# Patient Record
Sex: MALE | Race: WHITE | NOT HISPANIC OR LATINO | URBAN - METROPOLITAN AREA
[De-identification: names, ages, dates, MRNs, and addresses within clinical notes are randomized per-mention and may not be internally consistent; named-entity substitution may affect disease eponyms.]

---

## 2017-07-31 ENCOUNTER — OFFICE (OUTPATIENT)
Dept: URBAN - METROPOLITAN AREA CLINIC 64 | Facility: CLINIC | Age: 42
End: 2017-07-31

## 2017-07-31 VITALS
HEIGHT: 73 IN | HEART RATE: 76 BPM | SYSTOLIC BLOOD PRESSURE: 116 MMHG | DIASTOLIC BLOOD PRESSURE: 74 MMHG | WEIGHT: 248 LBS

## 2017-07-31 DIAGNOSIS — R07.9 CHEST PAIN, UNSPECIFIED: ICD-10-CM

## 2017-07-31 DIAGNOSIS — R10.13 EPIGASTRIC PAIN: ICD-10-CM

## 2017-07-31 DIAGNOSIS — K30 FUNCTIONAL DYSPEPSIA: ICD-10-CM

## 2017-07-31 DIAGNOSIS — R12 HEARTBURN: ICD-10-CM

## 2017-07-31 PROCEDURE — 99244 OFF/OP CNSLTJ NEW/EST MOD 40: CPT | Performed by: NURSE PRACTITIONER

## 2017-07-31 RX ORDER — SUCRALFATE 1 G/1
4 TABLET ORAL
Qty: 120 | Refills: 2 | Status: ACTIVE
Start: 2017-07-31

## 2017-08-30 ENCOUNTER — ON CAMPUS - OUTPATIENT (OUTPATIENT)
Dept: RURAL HOSPITAL 3 | Facility: HOSPITAL | Age: 42
End: 2017-08-30

## 2017-08-30 DIAGNOSIS — K21.9 GASTRO-ESOPHAGEAL REFLUX DISEASE WITHOUT ESOPHAGITIS: ICD-10-CM

## 2017-08-30 DIAGNOSIS — R10.13 EPIGASTRIC PAIN: ICD-10-CM

## 2017-08-30 DIAGNOSIS — R12 HEARTBURN: ICD-10-CM

## 2017-08-30 DIAGNOSIS — K30 FUNCTIONAL DYSPEPSIA: ICD-10-CM

## 2017-08-30 PROCEDURE — 43235 EGD DIAGNOSTIC BRUSH WASH: CPT | Performed by: INTERNAL MEDICINE

## 2018-10-16 ENCOUNTER — HOSPITAL ENCOUNTER (OUTPATIENT)
Dept: LAB | Facility: HOSPITAL | Age: 43
Discharge: HOME | End: 2018-10-16
Attending: INTERNAL MEDICINE | Admitting: INTERNAL MEDICINE

## 2018-10-16 ENCOUNTER — CONVERSION ENCOUNTER (OUTPATIENT)
Dept: RHEUMATOLOGY | Facility: CLINIC | Age: 43
End: 2018-10-16

## 2018-10-16 ENCOUNTER — HOSPITAL ENCOUNTER (OUTPATIENT)
Dept: RHEUMATOLOGY | Facility: CLINIC | Age: 43
Discharge: HOME | End: 2018-10-16
Attending: INTERNAL MEDICINE | Admitting: INTERNAL MEDICINE

## 2018-10-16 LAB
ALBUMIN SERPL-MCNC: 4.4 G/DL (ref 3.5–4.8)
ALBUMIN/GLOB SERPL: 1.5 {RATIO} (ref 1–1.7)
ALP SERPL-CCNC: 61 IU/L (ref 32–91)
ALT SERPL-CCNC: 75 IU/L (ref 17–63)
ANION GAP SERPL CALC-SCNC: 14.1 MMOL/L (ref 10–20)
AST SERPL-CCNC: 39 IU/L (ref 15–41)
BASOPHILS # BLD AUTO: 0.1 10*3/UL (ref 0–0.2)
BASOPHILS NFR BLD AUTO: 2 % (ref 0–2)
BILIRUB SERPL-MCNC: 0.8 MG/DL (ref 0.3–1.2)
BILIRUB UR QL STRIP: NEGATIVE MG/DL
BUN SERPL-MCNC: 15 MG/DL (ref 8–20)
BUN/CREAT SERPL: 16.7 (ref 6.2–20.3)
CALCIUM SERPL-MCNC: 9.4 MG/DL (ref 8.9–10.3)
CASTS URNS QL MICRO: NORMAL /[LPF]
CCP IGG ANTIBODIES: <0.4 U/ML
CHLORIDE SERPL-SCNC: 102 MMOL/L (ref 101–111)
COLOR UR: YELLOW
CONV BACTERIA IN URINE MICRO: NEGATIVE
CONV CLARITY OF URINE: CLEAR
CONV CO2: 27 MMOL/L (ref 22–32)
CONV HYALINE CASTS IN URINE MICRO: 0 /[LPF] (ref 0–5)
CONV PROTEIN IN URINE BY AUTOMATED TEST STRIP: NEGATIVE MG/DL
CONV SMALL ROUND CELLS: NORMAL /[HPF]
CONV TOTAL PROTEIN: 7.4 G/DL (ref 6.1–7.9)
CONV UROBILINOGEN IN URINE BY AUTOMATED TEST STRIP: 0.2 MG/DL
CREAT UR-MCNC: 0.9 MG/DL (ref 0.7–1.2)
CRP SERPL-MCNC: 0.12 MG/DL (ref 0–0.7)
CULTURE INDICATED?: NORMAL
DIFFERENTIAL METHOD BLD: (no result)
EOSINOPHIL # BLD AUTO: 0.2 10*3/UL (ref 0–0.3)
EOSINOPHIL # BLD AUTO: 3 % (ref 0–3)
ERYTHROCYTE [DISTWIDTH] IN BLOOD BY AUTOMATED COUNT: 13.2 % (ref 11.5–14.5)
ERYTHROCYTE [SEDIMENTATION RATE] IN BLOOD BY WESTERGREN METHOD: 12 MM/HR (ref 0–15)
GLOBULIN UR ELPH-MCNC: 3 G/DL (ref 2.5–3.8)
GLUCOSE SERPL-MCNC: 112 MG/DL (ref 65–99)
GLUCOSE UR QL: NEGATIVE MG/DL
HAV IGM SERPL QL IA: NONREACTIVE
HBV CORE IGM SERPL QL IA: NONREACTIVE
HBV SURFACE AG SERPL QL IA: NONREACTIVE
HCT VFR BLD AUTO: 44.2 % (ref 40–54)
HCV AB SER DONR QL: NONREACTIVE
HGB BLD-MCNC: 15.1 G/DL (ref 14–18)
HGB UR QL STRIP: NEGATIVE
KETONES UR QL STRIP: NEGATIVE MG/DL
LEUKOCYTE ESTERASE UR QL STRIP: NEGATIVE
LYMPHOCYTES # BLD AUTO: 1.7 10*3/UL (ref 0.8–4.8)
LYMPHOCYTES NFR BLD AUTO: 30 % (ref 18–42)
MCH RBC QN AUTO: 29.2 PG (ref 26–32)
MCHC RBC AUTO-ENTMCNC: 34.1 G/DL (ref 32–36)
MCV RBC AUTO: 85.7 FL (ref 80–94)
MONOCYTES # BLD AUTO: 0.5 10*3/UL (ref 0.1–1.3)
MONOCYTES NFR BLD AUTO: 9 % (ref 2–11)
NEUTROPHILS # BLD AUTO: 3.1 10*3/UL (ref 2.3–8.6)
NEUTROPHILS NFR BLD AUTO: 56 % (ref 50–75)
NITRITE UR QL STRIP: NEGATIVE
NRBC BLD AUTO-RTO: 0 /100{WBCS}
NRBC/RBC NFR BLD MANUAL: 0 10*3/UL
PH UR STRIP.AUTO: 6 [PH] (ref 4.5–8)
PLATELET # BLD AUTO: 209 10*3/UL (ref 150–450)
PMV BLD AUTO: 7.6 FL (ref 7.4–10.4)
POTASSIUM SERPL-SCNC: 4.1 MMOL/L (ref 3.6–5.1)
RBC # BLD AUTO: 5.16 10*6/UL (ref 4.6–6)
RBC #/AREA URNS HPF: 0 /[HPF] (ref 0–3)
SODIUM SERPL-SCNC: 139 MMOL/L (ref 136–144)
SP GR UR: 1.01 (ref 1–1.03)
SPERM URNS QL MICRO: NORMAL /[HPF]
SQUAMOUS SPT QL MICRO: 0 /[HPF] (ref 0–5)
UNIDENT CRYS URNS QL MICRO: NORMAL /[HPF]
WBC # BLD AUTO: 5.5 10*3/UL (ref 4.5–11.5)
WBC #/AREA URNS HPF: 0 /[HPF] (ref 0–5)
YEAST SPEC QL WET PREP: NORMAL /[HPF]

## 2018-10-17 LAB
CHROMATIN AB SERPL-ACNC: <20 [IU]/ML (ref 0–20)
CONV HIV-1/ HIV-2: NEGATIVE
HLA-B27 QL NAA+PROBE: NEGATIVE
INTERPRETATION: NORMAL

## 2019-01-04 ENCOUNTER — CONVERSION ENCOUNTER (OUTPATIENT)
Dept: RHEUMATOLOGY | Facility: CLINIC | Age: 44
End: 2019-01-04

## 2019-01-16 ENCOUNTER — HOSPITAL ENCOUNTER (OUTPATIENT)
Dept: LAB | Facility: HOSPITAL | Age: 44
Discharge: HOME | End: 2019-01-16
Attending: INTERNAL MEDICINE | Admitting: INTERNAL MEDICINE

## 2019-01-16 LAB — CK SERPL-CCNC: 238 IU/L (ref 49–397)

## 2019-01-16 PROCEDURE — 86481 TB AG RESPONSE T-CELL SUSP: CPT

## 2019-01-17 ENCOUNTER — LAB REQUISITION (OUTPATIENT)
Dept: LAB | Facility: HOSPITAL | Age: 44
End: 2019-01-17

## 2019-01-17 DIAGNOSIS — Z00.00 ROUTINE GENERAL MEDICAL EXAMINATION AT A HEALTH CARE FACILITY: ICD-10-CM

## 2019-01-18 LAB
TSPOT INTERPRETATION: NEGATIVE
TSPOT INTERPRETATION: NORMAL
TSPOT NIL CONTROL INTERPRETATION: NORMAL
TSPOT PANEL A: 0
TSPOT PANEL B: 0
TSPOT POS CONTROL INTERPRETATION: NORMAL

## 2019-06-04 VITALS
DIASTOLIC BLOOD PRESSURE: 83 MMHG | HEART RATE: 88 BPM | WEIGHT: 265 LBS | HEART RATE: 93 BPM | DIASTOLIC BLOOD PRESSURE: 83 MMHG | HEIGHT: 73 IN | SYSTOLIC BLOOD PRESSURE: 136 MMHG | HEIGHT: 73 IN | WEIGHT: 250 LBS | BODY MASS INDEX: 33.13 KG/M2 | BODY MASS INDEX: 35.12 KG/M2 | SYSTOLIC BLOOD PRESSURE: 134 MMHG

## 2019-09-24 ENCOUNTER — OFFICE VISIT (OUTPATIENT)
Dept: NEUROLOGY | Facility: CLINIC | Age: 44
End: 2019-09-24

## 2019-09-24 VITALS
DIASTOLIC BLOOD PRESSURE: 94 MMHG | HEIGHT: 73 IN | SYSTOLIC BLOOD PRESSURE: 142 MMHG | BODY MASS INDEX: 31.81 KG/M2 | HEART RATE: 63 BPM | WEIGHT: 240 LBS

## 2019-09-24 DIAGNOSIS — G47.10 HYPERSOMNIA, PERSISTENT: ICD-10-CM

## 2019-09-24 DIAGNOSIS — G47.33 OBSTRUCTIVE SLEEP APNEA SYNDROME: Primary | ICD-10-CM

## 2019-09-24 PROCEDURE — 99213 OFFICE O/P EST LOW 20 MIN: CPT | Performed by: PSYCHIATRY & NEUROLOGY

## 2019-09-24 RX ORDER — HYDROCODONE BITARTRATE AND ACETAMINOPHEN 7.5; 325 MG/1; MG/1
TABLET ORAL
COMMUNITY
Start: 2019-09-06

## 2019-09-24 RX ORDER — RANITIDINE 150 MG/1
150 CAPSULE ORAL
COMMUNITY

## 2019-09-24 RX ORDER — LEVOCETIRIZINE DIHYDROCHLORIDE 5 MG/1
TABLET, FILM COATED ORAL
COMMUNITY
Start: 2019-09-23

## 2019-09-24 RX ORDER — CYCLOBENZAPRINE HCL 5 MG
TABLET ORAL
COMMUNITY
Start: 2019-09-06

## 2019-09-24 RX ORDER — DULOXETIN HYDROCHLORIDE 30 MG/1
CAPSULE, DELAYED RELEASE ORAL
COMMUNITY
Start: 2019-09-06

## 2019-09-24 NOTE — PROGRESS NOTES
Sleep medicine follow-up visit    Jose Sol   1975  44 y.o. male   DATE OF SERVICE: 9/24/2019     Yearly f/u last seen in 2016 for CPAP compliance, patient currently has a broken pap machine and is using a loaner from [employer]. Patient been c/o more daytime fatigue sleeps good with the pap machine.     SLEEP TESTING HISTORY:    On NPSG at [facility] , 2012 patient had Severe obstructive sleep apnea syndrome with apnea-hypopnea index of 50 per sleep hour,     The compliance data reviewed and the patient is on CPAP therapy at 8-12 cm/H2O and compliance data indicates excellent compliance with 100% usage for more than 4 hours with an average usage of 8 hours 40 minutes. AHI down to 3.0 with CPAP therapy and mean CPAP pressure 8.6 cm of water.  The patient's hypersomnia also resolved with [place] Sleepiness Scale score of 18 with CPAP therapy.  The patient  is benefiting from it and is compliant.     Review of Systems   Constitutional: Positive for fatigue. Negative for appetite change.   HENT: Negative for congestion, sinus pressure and sinus pain.    Eyes: Negative for pain and itching.   Respiratory: Negative for cough and shortness of breath.    Cardiovascular: Negative for chest pain and palpitations.   Gastrointestinal: Negative for constipation and nausea.   Endocrine: Negative for cold intolerance and heat intolerance.   Genitourinary: Negative for difficulty urinating and frequency.   Musculoskeletal: Positive for back pain and neck pain.   Allergic/Immunologic: Positive for environmental allergies.   Neurological: Negative for dizziness, tremors, seizures, syncope, facial asymmetry, speech difficulty, weakness, light-headedness, numbness and headaches.   Psychiatric/Behavioral: Negative for agitation and confusion.     I reviewed and addressed ROS entered by MA.      Current Outpatient Medications:   •  cyclobenzaprine (FLEXERIL) 5 MG tablet, , Disp: , Rfl:   •  DULoxetine (CYMBALTA) 30  MG capsule, , Disp: , Rfl:   •  HYDROcodone-acetaminophen (NORCO) 7.5-325 MG per tablet, , Disp: , Rfl:   •  levocetirizine (XYZAL) 5 MG tablet, , Disp: , Rfl:   •  raNITIdine (ZANTAC) 150 MG capsule, Take 150 mg by mouth., Disp: , Rfl:   No Known Allergies     PHYSICAL EXAMINATION:  Vitals:    09/24/19 1110   BP: 142/94   Pulse: 63      Body mass index is 31.66 kg/m².       HEENT: Normal.    CARDIAC: Normal.   LUNGS: Clear to auscultation.   EXTREMITIES: No edema.     IMPRESSION: Patient with obstructive sleep apnea syndrome with hypersomnia successfully treated with CPAP therapy and is compliant and benefiting from it. Compliance 100%    Hypersomnia, persistent despite using CPAP, worse in past year,     will increase pressure to 10-14  Obtain new pap and fu in 30-90 days      EPWORTH SLEEPINESS SCALE  Sitting and reading  3  WatchingTV  3  Sitting, inactive, in a public place  3  As a passenger in a car for 1 hour w/o a break  3  Lying down to rest in the afternoon  3  Sitting and talking to someone  0  Sitting quietly after a lunch  3  In a car, while stopped for traffic or a light  0  Total 18        This document has been electronically signed by Joseph Seipel, MD on September 24, 2019 11:27 AM

## 2019-09-26 ENCOUNTER — TELEPHONE (OUTPATIENT)
Dept: NEUROLOGY | Facility: CLINIC | Age: 44
End: 2019-09-26

## 2019-09-26 DIAGNOSIS — G47.33 OBSTRUCTIVE SLEEP APNEA: Primary | ICD-10-CM
